# Patient Record
Sex: MALE | Race: BLACK OR AFRICAN AMERICAN | NOT HISPANIC OR LATINO | URBAN - METROPOLITAN AREA
[De-identification: names, ages, dates, MRNs, and addresses within clinical notes are randomized per-mention and may not be internally consistent; named-entity substitution may affect disease eponyms.]

---

## 2024-06-13 ENCOUNTER — EMERGENCY (EMERGENCY)
Facility: HOSPITAL | Age: 49
LOS: 0 days | Discharge: HOME | End: 2024-06-13
Attending: STUDENT IN AN ORGANIZED HEALTH CARE EDUCATION/TRAINING PROGRAM
Payer: COMMERCIAL

## 2024-06-13 VITALS
SYSTOLIC BLOOD PRESSURE: 131 MMHG | HEART RATE: 66 BPM | TEMPERATURE: 99 F | DIASTOLIC BLOOD PRESSURE: 80 MMHG | OXYGEN SATURATION: 100 % | RESPIRATION RATE: 18 BRPM | HEIGHT: 69 IN | WEIGHT: 138.89 LBS

## 2024-06-13 VITALS
RESPIRATION RATE: 18 BRPM | TEMPERATURE: 98 F | DIASTOLIC BLOOD PRESSURE: 69 MMHG | OXYGEN SATURATION: 100 % | SYSTOLIC BLOOD PRESSURE: 116 MMHG | HEART RATE: 55 BPM

## 2024-06-13 DIAGNOSIS — H10.213 ACUTE TOXIC CONJUNCTIVITIS, BILATERAL: ICD-10-CM

## 2024-06-13 DIAGNOSIS — H57.89 OTHER SPECIFIED DISORDERS OF EYE AND ADNEXA: ICD-10-CM

## 2024-06-13 DIAGNOSIS — H57.13 OCULAR PAIN, BILATERAL: ICD-10-CM

## 2024-06-13 DIAGNOSIS — H53.8 OTHER VISUAL DISTURBANCES: ICD-10-CM

## 2024-06-13 DIAGNOSIS — Z77.098 CONTACT WITH AND (SUSPECTED) EXPOSURE TO OTHER HAZARDOUS, CHIEFLY NONMEDICINAL, CHEMICALS: ICD-10-CM

## 2024-06-13 PROCEDURE — 99283 EMERGENCY DEPT VISIT LOW MDM: CPT

## 2024-06-13 NOTE — ED ADULT NURSE NOTE - CAS EDN DISCHARGE ASSESSMENT
Pt ambulating with equal steady gait in no acute distress./Alert and oriented to person, place and time/Patient baseline mental status/Awake

## 2024-06-13 NOTE — ED ADULT NURSE NOTE - OBJECTIVE STATEMENT
Pt c/o bleach splashing in b/l eye, nose and face. pt stated he washed eyes with water. pt denies PMH

## 2024-06-13 NOTE — ED PROVIDER NOTE - ATTENDING APP SHARED VISIT CONTRIBUTION OF CARE
49 y/o m no pmhx presents w/ bilateral eye discomfort s/p chemical exposure. had bucket of bleach that splashed into his eyes. irrigated w/ water prior to arrival to ER. denies change sin vision. denies difficulty breathing/swallowing. does not wear contact lenses or glasses .    20/30 OD, 20/20 OS. conjunctival clear, pupil round and reactive. well appearing, not in any distress. no airway involvement.    perla lens for irrigation given alkaline exposure   - symptoms resolved per patient  fluorescin stain - no ulcers appreciated    dc w/ Optho f/u . does not require admission.     Conversation had with patient regarding results of testing. Patient agrees with plan for discharge at this time. Patient agrees to comply with follow up with PCP. Return to ED precautions and discharge instructions given to patient.    I performed a history and physical exam of the patient and discussed their management with the PATRICIA. I have reviewed the PATRICIA note and agree with the documented findings and plan of care, except as noted. This was a shared visit with an PATRICIA. I reviewed and verified the documentation and independently performed my own history/exam/medical decision making. My medical decision making and observations are found above. Please refer to any progress notes for updates on clinical course.

## 2024-06-13 NOTE — ED ADULT TRIAGE NOTE - SPO2 (%)
I ordered a home sleep test--I want him to have this through [facility] office. Can you call to set up? He will also need the f/u consult with Dr. Vergara or her APN  100

## 2024-06-13 NOTE — ED PROVIDER NOTE - NSFOLLOWUPCLINICS_GEN_ALL_ED_FT
[facility]  Ophthalmology  [address] [place] [address], [facility] [address]  [place] [address]  Phone: [phone]  Fax:

## 2024-06-13 NOTE — ED PROVIDER NOTE - CLINICAL SUMMARY MEDICAL DECISION MAKING FREE TEXT BOX
48M w/ no reported PMH who presents to ED w/ eye redness/pain x 1 hour ago. Pt states he was carrying a bucket of sun-pine cleaning products and while walking it splashed into his eyes, pt now w/ some eye irritation and mild blurred vision. Pt irrigated the eye immediately after incident and states symptoms slightly improved. Pt now w/ normal vision. 48M w/ no reported PMH who presents to ED w/ eye redness/pain x 1 hour ago. Pt states he was carrying a bucket of sun-pine cleaning products and while walking it splashed into his eyes, pt now w/ some eye irritation and mild blurred vision. Pt irrigated the eye immediately after incident and states symptoms slightly improved. Pt now w/ normal vision.    Minimal bilateral eye erythema. 48M w/ no reported PMH who presents to ED w/ eye redness/pain x 1 hour ago. Pt states he was carrying a bucket of sun-pine cleaning products and while walking it splashed into his eyes, pt now w/ some eye irritation and mild blurred vision. Pt irrigated the eye immediately after incident and states symptoms slightly improved. Pt now w/ normal vision. Pt does not use reading glasses or contact lenses. Denies fever, chills, chest pain, shortness of breath, abdominal pain, N/V/D, dysuria, urinary frequency/urgency, extremity weakness/numbness/tingling, lightheadedness, dizziness, or headaches.    Patient currently afebrile, hemodynamically stable, spO2 100%. On PE - pt well-appearing, in no acute distress, heart w/ RRR, chest symmetrical, non-labored breathing, lungs clear bilaterally, minimal bilateral eye erythema, no fluorescein uptake bilaterally, visual fields intact bilaterally, EOMI, no pain w/ EOM bilaterally. Based on history and physical, differentials include but are not limited to chemical conjunctivitis.     Shared Decision Making - Pt w/ mild eye erythema, EOM intact without any pain or difficulty, no obvious corneal abrasions or ulcerations. Eyes irrigated bilaterally with perla lenses, pt reports significant improvement w/ irrigation. Patient is medically stable for discharge. Strict return precautions given, discussed red flag signs/symptoms. Patient to follow up with PMD, Optho. Patient/parent displays understanding and agreeable with plan, comfortable with discharge plan home. Plan for discharge discussed with Dr. Buchanan who agrees with disposition and discharge plan.

## 2024-06-13 NOTE — ED PROVIDER NOTE - PATIENT PORTAL LINK FT
You can access the FollowMyHealth Patient Portal offered by [facility] by registering at the following website: [email or web address]. By joining [employer]’s FollowMyHealth portal, you will also be able to view your health information using other applications (apps) compatible with our system.

## 2024-06-13 NOTE — ED PROVIDER NOTE - NSFOLLOWUPINSTRUCTIONS_ED_ALL_ED_FT
- Follow-up with Ophthalmologist within the next week.  - Follow-up with your Primary Care Physician within the next week.    Medications  - Artificial eye drops, in each affected eye, as needed for eye irritations.    Advance activity as tolerated.  Continue all previously prescribed medications as directed unless otherwise instructed.  Follow up with your primary care physician in 48-72 hours- bring copies of your results.  Return to the ER for worsening or persistent symptoms, and/or ANY NEW OR CONCERNING SYMPTOMS such as fever, chest pain, shortness of breath, abdominal pain, eye pain, difficulty or inability with eye movement, or headaches. If you have issues obtaining follow up, please call: [phone] to obtain a doctor or specialist who takes your insurance in your area.  You may call [phone] to make an appointment with the internal medicine clinic.    Chemical conjunctivitis, or chemical pink eye, is inflammation of a part of the eye called the conjunctiva. The conjunctiva is the clear covering of the white of your eye and your inner eyelid. The condition makes your eye red, pink, and itchy.    This condition can occur in one or both of your eyes. It cannot not spread from person to person (is not contagious).    What are the causes?  This condition is caused by a chemical or irritant, such as:    Smoke.  Chlorine.  Soap.  Fumes.  Air pollution.    You can get this condition if a chemical or irritant gets in your eye.    What increases the risk?  This condition is more likely to develop in:    People who live in places with high levels of air pollution.  People who work in environments with chemicals in the air.  People who use swimming pools often.  People who wear contact lenses.    What are the signs or symptoms?  Symptoms of this condition include:    Eye redness.  Tearing of the eyes.  Itchy eyes.  Burning feeling in the eyes.  Clear drainage from the eyes.  Swollen eyelids.  Sensitivity to light.    How is this diagnosed?  This condition is diagnosed based on your symptoms, your medical history, and a physical exam. During the exam, your health care provider may use a medical instrument that uses magnified light (slit lamp) to examine your eyes. If you have drainage in your eyes, it may be tested to rule out other causes.    How is this treated?  Treatment for this condition involves carefully rinsing (flushing) the chemical out of your eye. Treatment may also include:    Artificial tears to help wash out any remaining chemical.  Steroid medicine to ease inflammation.  Antibiotic medicine to prevent infection.  Cold compresses to ease discomfort and inflammation.  Over-the-counter pain medicines to ease discomfort.    Follow these instructions at home:  Take or apply over-the-counter and prescription medicines only as told by your health care provider.  If you were prescribed an antibiotic medicine, take or apply it as told by your health care provider. Do not stop using the antibiotic even if you start to feel better.  Until the inflammation is gone or as long as directed by your health care provider:    Do not wear contact lenses. Wear glasses instead.  Do not wear eye makeup.  Do not touch or rub your eyes.  Apply a cool, clean compress to your eye for 10–20 minutes, 3–4 times a day for comfort.  Avoid being around the chemical or environment that caused the irritation. Wear eye protection as necessary.  Wash your hands with soap and water before you apply eye drops or cool compresses to your eyes. If soap and water are not available, use hand .  Keep all follow-up visits as told by your health care provider. This is important.    Contact a health care provider if:  Your symptoms do not improve or they get worse.  You have new symptoms.  Your pain gets worse.  You have pus draining from your eye.    Get help right away if:  Your vision suddenly gets worse.    Summary  Chemical conjunctivitis, or chemical pink eye, is inflammation of a part of the eye called the conjunctiva.  This condition is caused by a chemical or irritant.  This condition is diagnosed based on your symptoms, your medical history, and a physical exam.  Treatment for this condition involves carefully rinsing (flushing) the chemical out of your eye.  Until the inflammation is gone, do not wear contact lenses or eye makeup.    ADDITIONAL NOTES AND INSTRUCTIONS    Please follow up with your Primary MD in 24-48 hr.  Seek immediate medical care for any new/worsening signs or symptoms.

## 2024-06-13 NOTE — ED ADULT TRIAGE NOTE - CHIEF COMPLAINT QUOTE
BIBEMS c/o b/l eye pain and redness s/p carrying bucket of bleach that splashed up to eyes. Pt splashed water in eyes after. Reports mild blurred vision. PMH denies.